# Patient Record
Sex: FEMALE | Race: OTHER | HISPANIC OR LATINO | URBAN - METROPOLITAN AREA
[De-identification: names, ages, dates, MRNs, and addresses within clinical notes are randomized per-mention and may not be internally consistent; named-entity substitution may affect disease eponyms.]

---

## 2022-01-25 ENCOUNTER — EMERGENCY (EMERGENCY)
Facility: HOSPITAL | Age: 32
LOS: 1 days | Discharge: HOME | End: 2022-01-25
Attending: EMERGENCY MEDICINE | Admitting: EMERGENCY MEDICINE
Payer: MEDICAID

## 2022-01-25 VITALS
TEMPERATURE: 98 F | HEART RATE: 55 BPM | RESPIRATION RATE: 18 BRPM | SYSTOLIC BLOOD PRESSURE: 107 MMHG | OXYGEN SATURATION: 100 % | DIASTOLIC BLOOD PRESSURE: 62 MMHG

## 2022-01-25 VITALS
HEIGHT: 59 IN | WEIGHT: 160.06 LBS | TEMPERATURE: 98 F | HEART RATE: 56 BPM | DIASTOLIC BLOOD PRESSURE: 83 MMHG | RESPIRATION RATE: 18 BRPM | OXYGEN SATURATION: 99 % | SYSTOLIC BLOOD PRESSURE: 118 MMHG

## 2022-01-25 DIAGNOSIS — J02.9 ACUTE PHARYNGITIS, UNSPECIFIED: ICD-10-CM

## 2022-01-25 DIAGNOSIS — Z20.822 CONTACT WITH AND (SUSPECTED) EXPOSURE TO COVID-19: ICD-10-CM

## 2022-01-25 DIAGNOSIS — R53.1 WEAKNESS: ICD-10-CM

## 2022-01-25 LAB
ALBUMIN SERPL ELPH-MCNC: 4.5 G/DL — SIGNIFICANT CHANGE UP (ref 3.4–5)
ALP SERPL-CCNC: 80 U/L — SIGNIFICANT CHANGE UP (ref 40–120)
ALT FLD-CCNC: 34 U/L — SIGNIFICANT CHANGE UP (ref 12–42)
ANION GAP SERPL CALC-SCNC: 7 MMOL/L — LOW (ref 9–16)
APPEARANCE UR: CLEAR — SIGNIFICANT CHANGE UP
AST SERPL-CCNC: 39 U/L — HIGH (ref 15–37)
BILIRUB SERPL-MCNC: 0.4 MG/DL — SIGNIFICANT CHANGE UP (ref 0.2–1.2)
BILIRUB UR-MCNC: NEGATIVE — SIGNIFICANT CHANGE UP
BUN SERPL-MCNC: 13 MG/DL — SIGNIFICANT CHANGE UP (ref 7–23)
CALCIUM SERPL-MCNC: 8.5 MG/DL — SIGNIFICANT CHANGE UP (ref 8.5–10.5)
CHLORIDE SERPL-SCNC: 104 MMOL/L — SIGNIFICANT CHANGE UP (ref 96–108)
CO2 SERPL-SCNC: 26 MMOL/L — SIGNIFICANT CHANGE UP (ref 22–31)
COLOR SPEC: YELLOW — SIGNIFICANT CHANGE UP
CREAT SERPL-MCNC: 0.74 MG/DL — SIGNIFICANT CHANGE UP (ref 0.5–1.3)
DIFF PNL FLD: NEGATIVE — SIGNIFICANT CHANGE UP
GLUCOSE SERPL-MCNC: 82 MG/DL — SIGNIFICANT CHANGE UP (ref 70–99)
GLUCOSE UR QL: NEGATIVE — SIGNIFICANT CHANGE UP
HCG UR QL: NEGATIVE — SIGNIFICANT CHANGE UP
HCT VFR BLD CALC: 37.4 % — SIGNIFICANT CHANGE UP (ref 34.5–45)
HGB BLD-MCNC: 12.7 G/DL — SIGNIFICANT CHANGE UP (ref 11.5–15.5)
KETONES UR-MCNC: 15 MG/DL
LEUKOCYTE ESTERASE UR-ACNC: NEGATIVE — SIGNIFICANT CHANGE UP
MAGNESIUM SERPL-MCNC: 2.4 MG/DL — SIGNIFICANT CHANGE UP (ref 1.6–2.6)
MCHC RBC-ENTMCNC: 33.2 PG — SIGNIFICANT CHANGE UP (ref 27–34)
MCHC RBC-ENTMCNC: 34 GM/DL — SIGNIFICANT CHANGE UP (ref 32–36)
MCV RBC AUTO: 97.7 FL — SIGNIFICANT CHANGE UP (ref 80–100)
NITRITE UR-MCNC: NEGATIVE — SIGNIFICANT CHANGE UP
NRBC # BLD: 0 /100 WBCS — SIGNIFICANT CHANGE UP (ref 0–0)
PH UR: 6 — SIGNIFICANT CHANGE UP (ref 5–8)
PHOSPHATE SERPL-MCNC: 3.8 MG/DL — SIGNIFICANT CHANGE UP (ref 2.5–4.5)
PLATELET # BLD AUTO: 211 K/UL — SIGNIFICANT CHANGE UP (ref 150–400)
POTASSIUM SERPL-MCNC: 3.5 MMOL/L — SIGNIFICANT CHANGE UP (ref 3.5–5.3)
POTASSIUM SERPL-SCNC: 3.5 MMOL/L — SIGNIFICANT CHANGE UP (ref 3.5–5.3)
PROT SERPL-MCNC: 8 G/DL — SIGNIFICANT CHANGE UP (ref 6.4–8.2)
PROT UR-MCNC: NEGATIVE MG/DL — SIGNIFICANT CHANGE UP
RBC # BLD: 3.83 M/UL — SIGNIFICANT CHANGE UP (ref 3.8–5.2)
RBC # FLD: 13 % — SIGNIFICANT CHANGE UP (ref 10.3–14.5)
SARS-COV-2 RNA SPEC QL NAA+PROBE: SIGNIFICANT CHANGE UP
SODIUM SERPL-SCNC: 137 MMOL/L — SIGNIFICANT CHANGE UP (ref 132–145)
SP GR SPEC: >=1.03 — SIGNIFICANT CHANGE UP (ref 1–1.03)
TSH SERPL-MCNC: 172.92 UIU/ML — HIGH (ref 0.36–3.74)
UROBILINOGEN FLD QL: 0.2 E.U./DL — SIGNIFICANT CHANGE UP
WBC # BLD: 4.73 K/UL — SIGNIFICANT CHANGE UP (ref 3.8–10.5)
WBC # FLD AUTO: 4.73 K/UL — SIGNIFICANT CHANGE UP (ref 3.8–10.5)

## 2022-01-25 PROCEDURE — 93010 ELECTROCARDIOGRAM REPORT: CPT

## 2022-01-25 PROCEDURE — 71046 X-RAY EXAM CHEST 2 VIEWS: CPT | Mod: 26

## 2022-01-25 PROCEDURE — 99284 EMERGENCY DEPT VISIT MOD MDM: CPT | Mod: 25

## 2022-01-25 RX ORDER — SODIUM CHLORIDE 9 MG/ML
1000 INJECTION INTRAMUSCULAR; INTRAVENOUS; SUBCUTANEOUS ONCE
Refills: 0 | Status: COMPLETED | OUTPATIENT
Start: 2022-01-25 | End: 2022-01-25

## 2022-01-25 RX ADMIN — SODIUM CHLORIDE 1000 MILLILITER(S): 9 INJECTION INTRAMUSCULAR; INTRAVENOUS; SUBCUTANEOUS at 17:04

## 2022-01-25 NOTE — ED PROVIDER NOTE - PROGRESS NOTE DETAILS
pat feel better. labs wnl, aside from elevated TSH. patient started synthroid again today after noncompliance since june. explained to patient this is likely cause of generalized weaknes..NO signs of myxedema coma on exam. clinically patient appears well with stable VS. had shared decision making conversation with patient regarding tx plan - as patient is stable she would like like to go home, conitnue her synthroid and follow outpatient with her dr. strongly encouraged medication compliance and endocrine follow up this week. she states she understands. discussed strict return precautions to return if she feels progressive weakness, presyncope, fevers, n/v, palpitations, cp, sob.

## 2022-01-25 NOTE — ED PROVIDER NOTE - NSFOLLOWUPINSTRUCTIONS_ED_ALL_ED_FT
Hypothyroidism    WHAT YOU NEED TO KNOW:    Hypothyroidism is a condition that develops when the thyroid gland does not make enough thyroid hormone. Thyroid hormones help control body temperature, heart rate, growth, and weight.    Thyroid and Parathyroid Glands         DISCHARGE INSTRUCTIONS:    Call your local emergency number (911 in the [place]) or have someone call if:   •You have sudden chest pain or shortness of breath.      •You have a seizure.      •You feel like you are going to faint.      Return to the emergency department if:   •You have diarrhea, tremors, or trouble sleeping.      •Your legs, ankles, or feet are swollen.      Call your doctor or endocrinologist if:   •You have a fever.      •You have chills, a cough, or feel weak and achy.      •You have pain and swelling in your muscles and joints.      •Your skin is itchy, swollen, or you have a rash.      •Your signs and symptoms return or get worse, even after treatment.      •You have questions or concerns about your condition or care.      Medicines:   •Thyroid hormone replacement medicine helps bring your thyroid hormone level back to normal. You will need to take this medicine for the rest of your life to control hypothyroidism. It is important to take the medicine every day as directed. You will be given instructions for what to do if you miss a dose.      •Take your medicine as directed. Contact your healthcare provider if you think your medicine is not helping or if you have side effects. Tell him or her if you are allergic to any medicine. Keep a list of the medicines, vitamins, and herbs you take. Include the amounts, and when and why you take them. Bring the list or the pill bottles to follow-up visits. Carry your medicine list with you in case of an emergency.      Manage hypothyroidism:   •Get more iodine. The thyroid gland uses iodine to work correctly and to make thyroid hormones. Your healthcare provider may tell you to eat foods that are rich in iodine. He or she will tell you how much of these foods to eat. Milk and seafood are good sources of iodine. You may also need iodine supplements.      •Keep track of your blood pressure and weight: ?Check your blood pressure and write it down as often as directed. It is important to measure your blood pressure on the same arm and in the same position each time. Keep track of your blood pressure readings, along with the date and time you took them. Take this record with you to your followup visits.  How to take a Blood Pressure           ?Weigh yourself daily before breakfast after you urinate. Weight gain may be a sign of extra fluid in your body. Keep track of your daily weights and take the record with you to your followup visits.  Weight Checks GEOVANNA             Follow up with your doctor or endocrinologist as directed: You may need to return for more blood tests to check your thyroid hormone level. This will show if you are getting the right amount of thyroid medicine. Write down your questions so you remember to ask them during your visits.    PLEASE FOLLOW UP WITH YOUR PMD/ENDOCRINOLOGY IN 2-3 DAYS. RETURN TO ED IF YOUR SYMPTOMS WORSEN

## 2022-01-25 NOTE — ED PROVIDER NOTE - OBJECTIVE STATEMENT
32 y/o F with PMH of hypothyroidism [on Levothyroxine; Not compliant with medications since June 2021 but recently started again yesterday] presents to the ED for generalized weakness and sore throat over the past week. Today, Pt noted stress at work. She felt a sensation described as a "ball in her throat" which went away on its own. Pt has felt this sensation on and off for the past few weeks. She had a tele medicine consult with her PCP at [facility] yesterday who started her back on Levothyroxine. Pt is due for follow up next week. She is tolerating PO intake at this time. Pt denies fevers, chills, N/V/D, CP, SOB.

## 2022-01-25 NOTE — ED ADULT TRIAGE NOTE - CHIEF COMPLAINT QUOTE
BIBA c/o "something feeling stuck in throat with difficulty breathing" starting 1hr PTA. +speaking in full sentences, O2 sat 99% on RA. +appears comfortable

## 2022-01-25 NOTE — ED ADULT NURSE NOTE - CHPI ED NUR SYMPTOMS NEG
no body aches/no chest pain/no chills/no cough/no diaphoresis/no edema/no fever/no hemoptysis/no wheezing

## 2022-01-25 NOTE — ED ADULT NURSE NOTE - OBJECTIVE STATEMENT
Pt c/o episode lasting a few minutes while at work of feeling "like my throat was closing" and it was hard to breathe. Also headache. States it has come and gone several times. Currently alert, even respirations, speaking in complete sentences. No known allergies.

## 2022-01-25 NOTE — ED PROVIDER NOTE - MUSCULOSKELETAL, MLM
Spine appears normal, range of motion is not limited, no muscle or joint tenderness. no pretibial edema

## 2022-01-25 NOTE — ED PROVIDER NOTE - PATIENT PORTAL LINK FT
You can access the FollowMyHealth Patient Portal offered by [facility] by registering at the following website: [email or web address]. By joining [facility]’s FollowMyHealth portal, you will also be able to view your health information using other applications (apps) compatible with our system.

## 2022-01-25 NOTE — ED PROVIDER NOTE - CLINICAL SUMMARY MEDICAL DECISION MAKING FREE TEXT BOX
30 y/o F presenting with generalized weakness. On exam, Pt appears well and in no acute distress. Plan for routine labs, EKG, and thyroid levels. Will likely refer to PCP and outpatient endocrinology.

## 2022-01-26 LAB
T3 SERPL-MCNC: 47 NG/DL — LOW (ref 80–200)
T4 AB SER-ACNC: 2.6 UG/DL — LOW (ref 4.6–12)